# Patient Record
Sex: FEMALE | Race: WHITE
[De-identification: names, ages, dates, MRNs, and addresses within clinical notes are randomized per-mention and may not be internally consistent; named-entity substitution may affect disease eponyms.]

---

## 2018-03-23 ENCOUNTER — HOSPITAL ENCOUNTER (OUTPATIENT)
Dept: HOSPITAL 74 - JASU-SURG | Age: 58
Discharge: HOME | End: 2018-03-23
Attending: ORTHOPAEDIC SURGERY
Payer: COMMERCIAL

## 2018-03-23 VITALS — DIASTOLIC BLOOD PRESSURE: 69 MMHG | TEMPERATURE: 98.2 F | SYSTOLIC BLOOD PRESSURE: 97 MMHG | HEART RATE: 76 BPM

## 2018-03-23 VITALS — BODY MASS INDEX: 26.6 KG/M2

## 2018-03-23 DIAGNOSIS — M75.102: Primary | ICD-10-CM

## 2018-03-23 DIAGNOSIS — M75.42: ICD-10-CM

## 2018-03-23 PROCEDURE — 0LQ24ZZ REPAIR LEFT SHOULDER TENDON, PERCUTANEOUS ENDOSCOPIC APPROACH: ICD-10-PCS | Performed by: ORTHOPAEDIC SURGERY

## 2018-03-23 PROCEDURE — 0RBK4ZZ EXCISION OF LEFT SHOULDER JOINT, PERCUTANEOUS ENDOSCOPIC APPROACH: ICD-10-PCS | Performed by: ORTHOPAEDIC SURGERY

## 2018-03-23 NOTE — HP
Satellite [facility]





- Chief Complaint


Chief Complaint: left shoulder pain





- Past Medical History


Allergies/Adverse Reactions: 


 Allergies











Allergy/AdvReac Type Severity Reaction Status Date / Time


 


oxycodone Allergy Severe Itching Verified 03/23/18 06:31











...LMP: 03/20/18





- Current Medications


Current Medications: 


 Home Medications











 Medication  Instructions  Recorded


 


Cetirizine HCl [Zyrtec -] 10 mg PO PRN 03/20/18


 


Tramadol HCl 50 mg PO Q6H #50 tablet MDD 4 03/23/18














Satellite Physical Exam





- Physical Examination


Vital Signs: 


 Vital Signs











 Period  Temp  Pulse  Resp  BP Sys/Cerda  Pulse Ox


 


 Last 24 Hr  97.6 F  69  16  119/74  100











General Appearance: Well Nourished, Well Developed, Alert & Oriented x3


ENT: Clear


Lung: Normal air movement


Heart: Regular rate & rhythm


Extremities: Other (left shoulder- + ttp, decr rom, + neer, + kan, nvi MRI 

+ partial rct)


Neurological: Intact, Alert, Oriented





Satellite Impression/Plan





- Impression/Plan


Impression: left shoulder impingement, partial rct


Operative Procedure: left shoulder arthroscopy with SAD possible RCR


Date to be Performed: 03/23/18

## 2018-03-23 NOTE — OP
Operative Note





- Note:


Operative Date: 03/23/18 ([facility])


Pre-Operative Diagnosis: left shoulder partial rct, impingement


Operation: left shoulder arthroscopy with RCR, SAD


Implants: 2 arthrex swivelocks


Post-Operative Diagnosis: Same as Pre-op


Surgeon: Sami Pompa


Assistant: Albert Haines


Anesthesiologist/CRNA: Caleb Ruffin


Anesthesia: General, Local


Specimens Removed: shavings


Estimated Blood Loss (mls): 5


Operative Report Dictated: Yes

## 2018-03-23 NOTE — OP
DATE OF OPERATION:  03/23/2018

 

PREOPERATIVE DIAGNOSIS:  Partial tear left rotator cuff and impingement.  

 

POSTOPERATIVE DIAGNOSIS:  High-grade left rotator cuff tear and impingement.  

 

PROCEDURE:  Arthroscopy of the left shoulder, subacromial debridement of bone and

soft tissue and rotator cuff repair.  

 

SURGICAL ATTENDING:  Sami Pompa MD

 

ASSISTANT:  SADIQ Markham

 

ANESTHESIA:  Regional and general. 

 

CLOSURE:  Two suture tape and swivel locks for rotator cuff and 3-0 nylon for skin.  

 

ESTIMATED BLOOD LOSS:  Negligible.

 

COMPLICATIONS:  None.

 

CONDITION:  Recovery room in stable condition.  

 

DESCRIPTION OF OPERATIVE PROCEDURE:  The patient was taken to the operating room on

March 23, 2018.  Regional and general anesthesia were administered by the

anesthesiologist.  IV Kefzol administered prophylactic prior to the case.  The

patient was placed in the beach chair position with all prominences well-padded and

the shoulder area was prepped and draped in the usual sterile fashion.  

 

First a posterior portal was made 2 fingerbreadths below the acromion.  _____ trocar.

 Circumferential exam of the glenohumeral joint revealed the following:  Intact

humeral head and glenoid, articular cartilage intact.  Labrum circumferentially

intact.  Biceps and biceps anchor intact subscapularis to its insertion.  No loose

bodies in the axillary pouch.  The supraspinatus was found to be intact, but looked

very thin from the articular surface.  The rest of the rotator cuff appeared to be

intact.  The fluid was drained from the shoulder and trocars were removed.  

 

Posterior trocar was redirected in the subacromial space and accessory lateral _____

trocar.  A large amount of bursal tissue was encountered in the subacromial space. 

This was debrided using the shaver and the ArthroCare device.  A large subacromial

spur was encountered.  This was debrided using the zaida up to the appropriate level

gaining good space for the rotator cuff beneath.  Looking inferiorly on the humeral

head, a large amount of bursal tissue was encountered.  This was debrided exposing

the rotator cuff beneath.  Probing on the supraspinatus revealed that the patient had

over a 90% thickness tear.  The probed easily penetrated through the rotator cuff

into the joint.  This area was opened using a scalpel blade exposing the entire tear.

 The greater tuberosity was burred and debrided exposing healthy bleeding bone for

reimplantation.  Using the Bianka Suture Passer, 2 suture tape sutures were passed

in horizontal mattress formation, one anterior, one posteriorly.  Traction on this

rotator cuff revealed easily able to be brought back to the greater tuberosity and

tension being reestablished.  These sutures were then fixated to the greater

tuberosity by 2 lateral swivel lock suture anchors achieving excellent fixation. 

Probing of the rotator cuff revealed excellent repair, had good matting down of the

surface onto the greater tuberosity.  Sutures were cut snug with the bone. Range of

motion revealed good stability and good space in the subacromial region.  Fluid was

drained.  The trocar locations were closed using 3-0 nylon suture.  Sterile pressure

dressing, followed by a shoulder immobilizer was applied.  Patient awoken from

anesthesia and transferred to Recovery in stable condition.  No complications. 

Estimated blood loss negligible. 

 

 

MARY FITCH/[id number]

DD: 03/23/2018 09:52

DT: 03/23/2018 11:04

Job #:  [id number]

## 2018-03-26 NOTE — PATH
Surgical Pathology Report



Patient Name:  SALLIE GARRISON

Accession #:  [id number]

Med. Rec. #:  [id number]                                                        

   /Age/Gender:  1960 (Age: 57) / F

Account:  [id number]                                                          

             Location: [facility] SURGICAL

Taken:  3/23/2018

Received:  3/23/2018

Reported:  3/26/2018

Physicians:  Sami Pompa M.D.

  



Specimen(s) Received

 LEFT SHOULDER SHAVINGS 





Clinical History

Impingement syndrome left shoulder







Final Diagnosis

LEFT SHOULDER, ARTHROSCOPIC SHAVING: 

PORTIONS OF SYNOVIUM, CARTILAGE, SKELETAL MUSCLE AND BONE CONSISTENT WITH

ARTHROSCOPIC SHAVINGS.





***Electronically Signed***

Mendoza Waters M.D.





Gross Description

Received in formalin, labeled "left shoulder shavings," is a 3.5 x 3.5 x 0.3 cm.

aggregate of tan-yellow soft tissue fragments. A representative portion is

submitted in one cassette.

/3/23/2018



saudi/3/23/2018